# Patient Record
Sex: MALE | Race: WHITE
[De-identification: names, ages, dates, MRNs, and addresses within clinical notes are randomized per-mention and may not be internally consistent; named-entity substitution may affect disease eponyms.]

---

## 2018-07-28 ENCOUNTER — HOSPITAL ENCOUNTER (OUTPATIENT)
Dept: HOSPITAL 97 - ER | Age: 53
Setting detail: OBSERVATION
LOS: 1 days | Discharge: HOME | End: 2018-07-29
Attending: FAMILY MEDICINE | Admitting: FAMILY MEDICINE
Payer: COMMERCIAL

## 2018-07-28 DIAGNOSIS — K21.9: ICD-10-CM

## 2018-07-28 DIAGNOSIS — I89.0: ICD-10-CM

## 2018-07-28 DIAGNOSIS — L03.115: ICD-10-CM

## 2018-07-28 DIAGNOSIS — I10: ICD-10-CM

## 2018-07-28 DIAGNOSIS — I95.9: ICD-10-CM

## 2018-07-28 DIAGNOSIS — G89.29: ICD-10-CM

## 2018-07-28 DIAGNOSIS — R55: Primary | ICD-10-CM

## 2018-07-28 DIAGNOSIS — Z72.89: ICD-10-CM

## 2018-07-28 DIAGNOSIS — R79.89: ICD-10-CM

## 2018-07-28 LAB
ALBUMIN SERPL BCP-MCNC: 3.7 G/DL (ref 3.4–5)
ALP SERPL-CCNC: 88 U/L (ref 45–117)
ALT SERPL W P-5'-P-CCNC: 50 U/L (ref 12–78)
AST SERPL W P-5'-P-CCNC: 103 U/L (ref 15–37)
BUN BLD-MCNC: 5 MG/DL (ref 7–18)
CKMB CREATINE KINASE MB: < 1 NG/ML (ref 0.3–3.6)
CKMB CREATINE KINASE MB: < 1 NG/ML (ref 0.3–3.6)
GLUCOSE SERPLBLD-MCNC: 109 MG/DL (ref 74–106)
HCT VFR BLD CALC: 43.7 % (ref 39.6–49)
INR BLD: 1.16
LYMPHOCYTES # SPEC AUTO: 0.9 K/UL (ref 0.7–4.9)
MAGNESIUM SERPL-MCNC: 2 MG/DL (ref 1.8–2.4)
MCH RBC QN AUTO: 34.1 PG (ref 27–35)
MCV RBC: 99.2 FL (ref 80–100)
NT-PROBNP SERPL-MCNC: 23 PG/ML (ref ?–125)
PMV BLD: 7.8 FL (ref 7.6–11.3)
POTASSIUM SERPL-SCNC: 3.2 MMOL/L (ref 3.5–5.1)
RBC # BLD: 4.4 M/UL (ref 4.33–5.43)

## 2018-07-28 PROCEDURE — 70450 CT HEAD/BRAIN W/O DYE: CPT

## 2018-07-28 PROCEDURE — 93005 ELECTROCARDIOGRAM TRACING: CPT

## 2018-07-28 PROCEDURE — 82550 ASSAY OF CK (CPK): CPT

## 2018-07-28 PROCEDURE — 83880 ASSAY OF NATRIURETIC PEPTIDE: CPT

## 2018-07-28 PROCEDURE — 82962 GLUCOSE BLOOD TEST: CPT

## 2018-07-28 PROCEDURE — 83605 ASSAY OF LACTIC ACID: CPT

## 2018-07-28 PROCEDURE — 84484 ASSAY OF TROPONIN QUANT: CPT

## 2018-07-28 PROCEDURE — 80048 BASIC METABOLIC PNL TOTAL CA: CPT

## 2018-07-28 PROCEDURE — 81003 URINALYSIS AUTO W/O SCOPE: CPT

## 2018-07-28 PROCEDURE — 84443 ASSAY THYROID STIM HORMONE: CPT

## 2018-07-28 PROCEDURE — 71045 X-RAY EXAM CHEST 1 VIEW: CPT

## 2018-07-28 PROCEDURE — 82553 CREATINE MB FRACTION: CPT

## 2018-07-28 PROCEDURE — 85610 PROTHROMBIN TIME: CPT

## 2018-07-28 PROCEDURE — 85025 COMPLETE CBC W/AUTO DIFF WBC: CPT

## 2018-07-28 PROCEDURE — 36415 COLL VENOUS BLD VENIPUNCTURE: CPT

## 2018-07-28 PROCEDURE — 80074 ACUTE HEPATITIS PANEL: CPT

## 2018-07-28 PROCEDURE — 85730 THROMBOPLASTIN TIME PARTIAL: CPT

## 2018-07-28 PROCEDURE — 93970 EXTREMITY STUDY: CPT

## 2018-07-28 PROCEDURE — 93880 EXTRACRANIAL BILAT STUDY: CPT

## 2018-07-28 PROCEDURE — 84439 ASSAY OF FREE THYROXINE: CPT

## 2018-07-28 PROCEDURE — 84145 PROCALCITONIN (PCT): CPT

## 2018-07-28 PROCEDURE — 80061 LIPID PANEL: CPT

## 2018-07-28 PROCEDURE — 99285 EMERGENCY DEPT VISIT HI MDM: CPT

## 2018-07-28 PROCEDURE — 80053 COMPREHEN METABOLIC PANEL: CPT

## 2018-07-28 PROCEDURE — 87040 BLOOD CULTURE FOR BACTERIA: CPT

## 2018-07-28 PROCEDURE — 83735 ASSAY OF MAGNESIUM: CPT

## 2018-07-28 PROCEDURE — 96365 THER/PROPH/DIAG IV INF INIT: CPT

## 2018-07-28 PROCEDURE — 80076 HEPATIC FUNCTION PANEL: CPT

## 2018-07-28 RX ADMIN — CLINDAMYCIN HYDROCHLORIDE SCH MG: 150 CAPSULE ORAL at 22:25

## 2018-07-28 RX ADMIN — SODIUM CHLORIDE SCH MLS: 0.9 INJECTION, SOLUTION INTRAVENOUS at 22:24

## 2018-07-28 RX ADMIN — Medication SCH ML: at 22:26

## 2018-07-28 RX ADMIN — MUPIROCIN SCH: 20 OINTMENT TOPICAL at 21:00

## 2018-07-28 NOTE — RAD REPORT
EXAM DESCRIPTION:  CT - Head Brain Wo Cont - 7/28/2018 1:36 pm

 

CLINICAL HISTORY:  SYNCOPE

Seizure

 

COMPARISON:  Chest Single View dated 7/28/2018

 

TECHNIQUE:  All CT scans are performed using dose optimization technique as appropriate and may inclu
de automated exposure control or mA/KV adjustment according to patient size.

 

FINDINGS:  No intracranial hemorrhage, hydrocephalus or extra-axial fluid collection.No areas of brai
n edema or evidence of midline shift.

 

The paranasal sinuses and mastoids are clear. The calvarium is intact.

 

IMPRESSION:  No acute intracranial abnormality.

## 2018-07-28 NOTE — ER
Nurse's Notes                                                                                     

 [facility]                                                                

Name: Theodore Schmidt                                                                              

Age: 53 yrs                                                                                       

Sex: Male                                                                                         

: 1965                                                                                   

MRN: [id number]                                                                                   

Arrival Date: 2018                                                                          

Time: 13:10                                                                                       

Account#: [id number]                                                                            

Bed 24                                                                                            

Private MD:                                                                                       

Diagnosis: Cellulitis of right lower limb;Syncope and collapse;Hypokalemia                        

                                                                                                  

Presentation:                                                                                     

                                                                                             

13:11 Presenting complaint: EMS states: patient was at his sister's store when he had a       kr2 

      syncopal episode and started having a seizure. By the time we got there he was A\T\Ox4      

      but his blood pressure dropped to 60/40. We started and IV and fluids and his blood         

      pressure came up to 90/50. He had a fall 1 week ago, hit his head, had a CT at the          

      hospital, he does have a hematoma from that fall. His sister reports she caught him         

      before he hit his head this time. Transition of care: patient was not received from         

      another setting of care. Onset of symptoms was 2018. Risk Assessment: Do you       

      want to hurt yourself or someone else? Patient reports no desire to harm self or            

      others. Initial Sepsis Screen: Does the patient meet any 2 criteria? No. Patient's          

      initial sepsis screen is negative. Does the patient have a suspected source of              

      infection? No. Patient's initial sepsis screen is negative. Care prior to arrival:          

      Medication(s) given: Normal saline infusion, 800mL IV initiated. 20 GA, in the left         

      antecubital area, Glucose check: 109.                                                       

13:11 Method Of Arrival: EMS: [facility]                                                    kr2 

13:11 Acuity: LIDYA 3                                                                           kr2 

13:38 Presenting complaint: Sister reports patient was sitting on a stool, began having a     kr2 

      seizure, passed out and she caught him before he hit the floor.                             

                                                                                                  

Triage Assessment:                                                                                

13:24 General: Appears in no apparent distress. comfortable, well groomed, well developed,    kr2 

      well nourished, Behavior is calm, cooperative, appropriate for age. Pain: Complains of      

      pain in back of head Pain does not radiate. Pain currently is 5 out of 10 on a pain         

      scale. Quality of pain is described as aching, tender, Pain began 1 week ago Is             

      continuous, Alleviated by medications, rest. Neuro: Level of Consciousness is awake,        

      alert, obeys commands, Oriented to person, place, time, situation, Appropriate for age      

       are equal bilaterally Moves all extremities. Speech is normal, Facial symmetry        

      appears normal, Pupils are PERRLA, Intact Reports headache occipital area, since 1 week     

      ago, following a similar episode today, seizure and fall.                                   

                                                                                                  

Historical:                                                                                       

- Allergies:                                                                                      

13:23 Iodine;                                                                                 kr2 

13:23 PENICILLINS;                                                                            kr2 

- Home Meds:                                                                                      

13:23 Norco  mg Oral tab [Active]; Tylenol #3 Oral [Active]; Lasix Oral [Active];       kr2 

      pantoprazole oral oral [Active]; Pepcid Oral [Active]; Benicar 40 mg oral tab 0.5 tab       

      [Active];                                                                                   

- PMHx:                                                                                           

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 

                                                                                                  

- Immunization history:: Adult Immunizations unknown.                                             

- Social history:: Smoking status: Patient/guardian denies using tobacco.                         

- Ebola Screening: : No symptoms or risks identified at this time.                                

                                                                                                  

                                                                                                  

Screenin:24 Abuse screen: Denies threats or abuse. Denies injuries from another. Nutritional        kr2 

      screening: No deficits noted. Tuberculosis screening: No symptoms or risk factors           

      identified. Fall Risk Fall in past 12 months (25 points). IV access (20 points).            

                                                                                                  

Assessment:                                                                                       

13:29 General: Appears in no apparent distress. comfortable, slender, well groomed, well      kr2 

      developed, well nourished, Behavior is calm, cooperative, appropriate for age, Smells       

      of alcohol. Neuro: Level of Consciousness is awake, alert, obeys commands, Oriented to      

      person, place, time, situation, Appropriate for age. Cardiovascular: Edema is 2+ to         

      left foot, left toes, right foot and right toes Patient reports he has lymphadema           

      Rhythm is regular. Respiratory: Airway is patent Respiratory effort is even, unlabored,     

      Respiratory pattern is regular, symmetrical. GI: Abdomen is flat, non-distended,            

      Patient currently denies nausea, vomiting, history of seizures. : Denies burning with     

      urination. EENT: Oral mucosa is moist. Derm: Skin is healthy with good turgor, Skin is      

      pink, warm \T\ dry. Erythema to right ankle. Patient has a scab there, reports he thought   

      he had been bitten by a mosquito, the area got infected and he was treated in the           

      hospital approximately one month ago. Musculoskeletal: Circulation, motion, and             

      sensation intact.                                                                           

13:29 Reassessment: Patient reports he has prescriptions for Tylenol 3 and Norco 10 but does  kr2 

      not take them.                                                                              

13:32 Reassessment: Patient taken to CT.                                                      kr2 

13:42 Reassessment: Patient back from CT at this time.                                        kr2 

13:52 Reassessment: Ultrasound tech in room at this time.                                     kr2 

15:30 Reassessment: Patient appears in no apparent distress at this time. Patient and/or      kr2 

      family updated on plan of care and expected duration. Pain level reassessed. Patient is     

      alert, oriented x 3, equal unlabored respirations, skin warm/dry/pink. Patient              

      complaining of headache, provider notified.                                                 

15:42 Reassessment: Patient appears in no apparent distress at this time. Patient and/or      kr2 

      family updated on plan of care and expected duration. Pain level reassessed. Patient is     

      alert, oriented x 3, equal unlabored respirations, skin warm/dry/pink. Patient has been     

      medicated as ordered for pain, see MAR.                                                     

17:00 Reassessment: Patient appears in no apparent distress at this time. Patient and/or      kr2 

      family updated on plan of care and expected duration. Pain level reassessed. Patient is     

      alert, oriented x 3, equal unlabored respirations, skin warm/dry/pink. Patient states       

      symptoms have improved.                                                                     

18:30 Reassessment: Patient appears in no apparent distress at this time. Patient and/or      kr2 

      family updated on plan of care and expected duration. Pain level reassessed. Patient is     

      alert, oriented x 3, equal unlabored respirations, skin warm/dry/pink. Patient states       

      feeling better.                                                                             

19:33 Reassessment: Patient appears in no apparent distress at this time. Patient and/or      kr2 

      family updated on plan of care and expected duration. Pain level reassessed. Patient is     

      alert, oriented x 3, equal unlabored respirations, skin warm/dry/pink. Patient given        

      ordered food tray Patient states feeling better.                                            

                                                                                                  

Vital Signs:                                                                                      

13:11  / 74; Pulse 83; Resp 18; Temp 97.7; Pulse Ox 99% ; Weight 81.65 kg; Height 5 ft. kr2 

      11 in. (180.34 cm); Pain 5/10;                                                              

13:53  / 78; Pulse 97; Resp 17; Pulse Ox 100% on R/A;                                   kr2 

15:43  / 76; Pulse 81; Resp 17; Pulse Ox 100% on R/A;                                   kr2 

18:30  / 87; Pulse 88; Resp 15; Pulse Ox 97% on R/A;                                    kr2 

19:35  / 79; Pulse 80; Resp 17; Temp 97.9(O); Pulse Ox 100% on R/A;                     kr2 

13:11 Body Mass Index 25.10 (81.65 kg, 180.34 cm)                                             kr2 

                                                                                                  

ED Course:                                                                                        

13:10 Patient arrived in ED.                                                                  kr2 

13:11 [facility]                                                        snw 

13:11 Cale Patel MD is Attending Physician.                                              snw 

13:11 Patient has correct armband on for positive identification. Placed in gown. Bed in low  kr2 

      position. Call light in reach. Side rails up X2. Seizure precautions initiated. Cardiac     

      monitor on. Pulse ox on. NIBP on. Door closed. Lights dimmed. Warm blanket given. Head      

      of bed elevated.                                                                            

13:11 Maintain EMS IV. Dressing intact. Good blood return noted. Site clean \T\ dry. Gauge \T\    kr
2

      site: 20g LAC. Flushed left antecubital with 5 ml normal saline.                            

13:15 Triage completed.                                                                       kr2 

13:28 Arm band placed on.                                                                     kr2 

13:36 CT completed. Patient moved to CT via stretcher. Patient moved back from CT.            cw1 

13:37 CT Head Brain wo Cont In Process Unspecified.                                           EDMS

13:37 X-ray completed. Portable x-ray completed in exam room. Patient tolerated procedure     la2 

      well.                                                                                       

13:41 XRAY Chest (1 view) In Process Unspecified.                                             EDMS

13:43 Radiology exam delayed due to with CT.                                                  sg3 

13:47 Ultrasound completed. Patient tolerated well.                                           sg3 

13:50 EKG done, by ED staff, reviewed by Zena ALVAREZ.                               cb2 

13:52 Pricila Ordonez RN is Primary Nurse.                                                     kr2 

14:33 Initial lab(s) drawn, by me, sent to lab. Inserted saline lock: 20 gauge in right       cb2 

      antecubital area, using aseptic technique. Blood collected.                                 

16:51 Lee Zhang DO is Hospitalizing Provider.                                           snw 

19:48 No provider procedures requiring assistance completed. Patient admitted, IV remains in  kr2 

      place.                                                                                      

                                                                                                  

Administered Medications:                                                                         

15:25 Drug: Clindamycin 900 mg Route: IVPB; Infused Over: 30 mins; Site: left antecubital;    kr2 

16:00 Follow up: Response: No adverse reaction; IV Status: Completed infusion                 kr2 

15:26 Drug: NS 0.9% 500 ml Route: IV; Rate: bolus; Site: left antecubital;                    kr2 

16:30 Follow up: Response: No adverse reaction; IV Status: Completed infusion                 kr2 

15:27 Drug: K-Lyte Effervescent Tablet 50 mEq Route: PO;                                      kr2 

19:47 Follow up: Response: No adverse reaction                                                kr2 

15:30 Drug: Motrin 400 mg Route: PO;                                                          kr2 

19:47 Follow up: Response: No adverse reaction; Pain is decreased                             kr2 

                                                                                                  

                                                                                                  

Point of Care Testing:                                                                            

      Blood Glucose:                                                                              

14:15 Blood Glucose: 105 mg/dL;                                                               kr2 

      Ranges:                                                                                     

                                                                                                  

Outcome:                                                                                          

16:51 Decision to Hospitalize by Provider.                                                    snw 

19:48 Admitted to Tele accompanied by tech, family with patient, via stretcher, room 403,     kr2 

      with chart, Report called to  DAX Russell                                                     

19:48 Condition: good                                                                             

19:48 Instructed on the need for admit, Demonstrated understanding of instructions.               

19:52 Patient left the ED.                                                                    kr2 

                                                                                                  

Signatures:                                                                                       

Dispatcher [employer]                           Zena Lara, KIM                 FNP-Lucille Pinto                             cw1                                                  

Mj Otoole Leslie                               la2                                                  

Pricila Ordonez RN                       RN   kr2                                                  

Catia Douglas                               sg3                                                  

                                                                                                  

Corrections: (The following items were deleted from the chart)                                    

13:39 13:11 Care prior to arrival: Medication(s) given: Normal saline infusion, 500 mL, IV    kr2 

      initiated. 20 GA, in the left antecubital area, Glucose check: 109 kr2                      

13:40 13:24 Pain: Complains of pain in back of head Pain does not radiate. Pain currently is  kr2 

      4 out of 10 on a pain scale. Quality of pain is described as aching, tender, Pain began     

      1 week ago Is continuous, Alleviated by medications, rest, kr2                              

13:45 13:29 Cardiovascular: Rhythm is regular kr2                                             kr2 

14:17 14:17 In radiology for Extrem Venous W Compression [email or web address]. EDMS                 sg3 

15: 13:29 Derm: Skin is intact, is healthy with good turgor, Skin is pink, warm \T\ dry. kr2  kr2

15:42 13:29 Musculoskeletal: Circulation, motion, and sensation intact. kr2                   kr2 

19:51 19:35  / 79; Pulse 80bpm; Resp 17bpm; Pulse Ox 100% RA; kr2                       kr2 

                                                                                                  

**************************************************************************************************

## 2018-07-28 NOTE — EDPHYS
Physician Documentation                                                                           

 [facility]                                                                

Name: Theodore Schmidt                                                                              

Age: 53 yrs                                                                                       

Sex: Male                                                                                         

: 1965                                                                                   

MRN: [id number]                                                                                   

Arrival Date: 2018                                                                          

Time: 13:10                                                                                       

Account#: [id number]                                                                            

Bed 24                                                                                            

Private MD:                                                                                       

ED Physician Cale Patel                                                                       

HPI:                                                                                              

                                                                                             

13:39 This 53 yrs old Male presents to ER via EMS with complaints of Syncope - Seizure.       snw 

13:39 The patient has experienced syncope, collapsed. Onset: The symptoms/episode             snw 

      began/occurred suddenly, just prior to arrival. Duration: This was a single episode,        

      that lasted an unknown period of time. Context: the episode(s) was witnessed, by            

      family, sister, occurred at a relative's home, occurred while the patient was standing,     

      Just prior to the episode the patient experienced no apparent symptoms. Associated          

      signs and symptoms: The patient has no apparent associated signs or symptoms. Current       

      symptoms: headache. It is unknown whether or not the patient has had similar symptoms       

      in the past. The patient has not recently seen a physician.                                 

                                                                                                  

Historical:                                                                                       

- Allergies:                                                                                      

13:23 Iodine;                                                                                 kr2 

13:23 PENICILLINS;                                                                            kr2 

- Home Meds:                                                                                      

13:23 Norco  mg Oral tab [Active]; Tylenol #3 Oral [Active]; Lasix Oral [Active];       kr2 

      pantoprazole oral oral [Active]; Pepcid Oral [Active]; Benicar 40 mg oral tab 0.5 tab       

      [Active];                                                                                   

- PMHx:                                                                                           

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 

                                                                                                  

- Immunization history:: Adult Immunizations unknown.                                             

- Social history:: Smoking status: Patient/guardian denies using tobacco.                         

- Ebola Screening: : No symptoms or risks identified at this time.                                

                                                                                                  

                                                                                                  

ROS:                                                                                              

13:39 Constitutional: Negative for fever, chills, and weight loss, Eyes: Negative for injury, snw 

      pain, redness, and discharge, ENT: Negative for injury, pain, and discharge, Neck:          

      Negative for injury, pain, and swelling, Cardiovascular: Negative for chest pain,           

      palpitations, and edema, Respiratory: Negative for shortness of breath, cough,              

      wheezing, and pleuritic chest pain, Abdomen/GI: Negative for abdominal pain, nausea,        

      vomiting, diarrhea, and constipation, Back: Negative for injury and pain, : Negative      

      for injury, bleeding, discharge, and swelling, MS/Extremity: Negative for injury and        

      deformity, Skin: Negative for injury, rash, and discoloration.                              

13:39 Neuro: Positive for headache, seizure activity, syncope.                                    

                                                                                                  

Exam:                                                                                             

13:32 Constitutional:  This is a well developed, well nourished patient who is awake, alert,  snw 

      and in no acute distress. Head/Face:  Normocephalic, atraumatic. Eyes:  Pupils equal        

      round and reactive to light, extra-ocular motions intact.  Lids and lashes normal.          

      Conjunctiva and sclera are non-icteric and not injected.  Cornea within normal limits.      

      Periorbital areas with no swelling, redness, or edema. ENT:  Nares patent. No nasal         

      discharge, no septal abnormalities noted.  Tympanic membranes are normal and external       

      auditory canals are clear.  Oropharynx with no redness, swelling, or masses, exudates,      

      or evidence of obstruction, uvula midline.  Mucous membranes moist. Neck:  Trachea          

      midline, no thyromegaly or masses palpated, and no cervical lymphadenopathy.  Supple,       

      full range of motion without nuchal rigidity, or vertebral point tenderness.  No            

      Meningismus. Chest/axilla:  Normal chest wall appearance and motion.  Nontender with no     

      deformity.  No lesions are appreciated. Cardiovascular:  Regular rate and rhythm with a     

      normal S1 and S2.  No gallops, murmurs, or rubs.  Normal PMI, no JVD.  No pulse             

      deficits. Respiratory:  Lungs have equal breath sounds bilaterally, clear to                

      auscultation and percussion.  No rales, rhonchi or wheezes noted.  No increased work of     

      breathing, no retractions or nasal flaring. Abdomen/GI:  Soft, non-tender, with normal      

      bowel sounds.  No distension or tympany.  No guarding or rebound.  No evidence of           

      tenderness throughout. Back:  No spinal tenderness.  No costovertebral tenderness.          

      Full range of motion. Neuro:  Awake and alert, GCS 15, oriented to person, place, time,     

      and situation.  Cranial nerves II-XII grossly intact.  Motor strength 5/5 in all            

      extremities.  Sensory grossly intact.  Cerebellar exam normal.  Normal gait.                

13:32 Skin: Appearance: Temperature: warm, erythema to left lateral ankle, + lymphedema to        

      bilateral lower ext.                                                                        

                                                                                                  

Vital Signs:                                                                                      

13:11  / 74; Pulse 83; Resp 18; Temp 97.7; Pulse Ox 99% ; Weight 81.65 kg; Height 5 ft. kr2 

      11 in. (180.34 cm); Pain 5/10;                                                              

13:53  / 78; Pulse 97; Resp 17; Pulse Ox 100% on R/A;                                   kr2 

15:43  / 76; Pulse 81; Resp 17; Pulse Ox 100% on R/A;                                   kr2 

18:30  / 87; Pulse 88; Resp 15; Pulse Ox 97% on R/A;                                    kr2 

19:35  / 79; Pulse 80; Resp 17; Temp 97.9(O); Pulse Ox 100% on R/A;                     kr2 

13:11 Body Mass Index 25.10 (81.65 kg, 180.34 cm)                                             kr2 

                                                                                                  

MDM:                                                                                              

13:11 Patient medically screened.                                                             snw 

16:51 Data reviewed: vital signs, nurses notes. Data interpreted: Pulse oximetry: on room air snw 

      is 100 %. Interpretation: normal. Counseling: I had a detailed discussion with the          

      patient and/or guardian regarding: the historical points, exam findings, and any            

      diagnostic results supporting the discharge/admit diagnosis, lab results, radiology         

      results, the need for further work-up and treatment in the hospital. Physician              

      consultation: Lee Zhang DO was called at 16:52, was contacted at 16:52, regarding       

      admission, to the telemetry unit. and will see patient in ED.                               

                                                                                                  

                                                                                             

13:13 Order name: Basic Metabolic Panel; Complete Time: 15:w 

                                                                                             

13:13 Order name: CBC with Diff; Complete Time: 14:58                                         w 

                                                                                             

13:13 Order name: Ckmb; Complete Time: 15:w 

                                                                                             

13:13 Order name: CPK; Complete Time: 15:w 

                                                                                             

13:13 Order name: LFT's; Complete Time: 15:w 

                                                                                             

13:13 Order name: Magnesium; Complete Time: 15:w 

                                                                                             

13:13 Order name: NT PRO-BNP; Complete Time: 15:w 

                                                                                             

13:13 Order name: PT-INR; Complete Time: 14:58                                                w 

                                                                                             

13:13 Order name: Ptt, Activated; Complete Time: 14:58                                        w 

                                                                                             

13:13 Order name: Troponin (emerg Dept Use Only); Complete Time: 14:58                        [facility] 

                                                                                             

13:13 Order name: Blood Culture Adult (2)                                                     [facility] 

                                                                                             

13:13 Order name: Lactate; Complete Time: 15:02                                               [facility] 

                                                                                             

15:04 Order name: Urine Dipstick--Ancillary (enter results); Complete Time: 15:20             eb  

                                                                                             

16:50 Order name: Procalcitonin; Complete Time: 17:39                                         [facility] 

                                                                                             

13:13 Order name: XRAY Chest (1 view); Complete Time: 13:57                                   [facility] 

                                                                                             

13:13 Order name: EKG; Complete Time: 13:14                                                   [facility] 

                                                                                             

13:13 Order name: CT Head Brain wo Cont; Complete Time: 13:57                                 [facility] 

                                                                                             

13:13 Order name: US Extremity Venous W Compression David; Complete Time: 15:05                 [facility] 

                                                                                             

17:11 Order name: CKMB Creatine Kinase MB                                                     [facility]

                                                                                             

17:11 Order name: CKMB Creatine Kinase MB                                                     [facility]

                                                                                             

17:11 Order name: CONS Physician Consult                                                      [facility]

                                                                                             

17:11 Order name: Heart Healthy                                                               [facility]

                                                                                             

17:11 Order name: Creatine Phosphokinase                                                      [facility]

                                                                                             

17:11 Order name: Troponin I                                                                  [facility]

                                                                                             

17:15 Order name: Diet Heart Healthy; Complete Time: 17:15                                    [facility] 

                                                                                             

18:32 Order name: Lactate Sepsis 2 HR Follow-up; Complete Time: 18:33                         [facility]

                                                                                             

13:13 Order name: Cardiac monitoring; Complete Time: 13:41                                    [facility] 

                                                                                             

13:13 Order name: EKG - Nurse/Tech; Complete Time: 13:50                                      [facility] 

                                                                                             

13:13 Order name: IV Saline Lock; Complete Time: 13:41                                        [facility] 

                                                                                             

13:13 Order name: Labs collected and sent; Complete Time: 14:34                               [facility] 

                                                                                             

13:13 Order name: O2 Per Protocol; Complete Time: 13:41                                       [facility] 

                                                                                             

13:13 Order name: O2 Sat Monitoring; Complete Time: 13:41                                     [facility] 

                                                                                             

13:13 Order name: Urine Dipstick-Ancillary (obtain specimen); Complete Time: 15:27            snw 

                                                                                                  

Administered Medications:                                                                         

15:25 Drug: Clindamycin 900 mg Route: IVPB; Infused Over: 30 mins; Site: left antecubital;    kr2 

16:00 Follow up: Response: No adverse reaction; IV Status: Completed infusion                 kr2 

15:26 Drug: NS 0.9% 500 ml Route: IV; Rate: bolus; Site: left antecubital;                    kr2 

16:30 Follow up: Response: No adverse reaction; IV Status: Completed infusion                 kr2 

15:27 Drug: K-Lyte Effervescent Tablet 50 mEq Route: PO;                                      kr2 

19:47 Follow up: Response: No adverse reaction                                                kr2 

15:30 Drug: Motrin 400 mg Route: PO;                                                          kr2 

19:47 Follow up: Response: No adverse reaction; Pain is decreased                             kr2 

                                                                                                  

                                                                                                  

Point of Care Testing:                                                                            

      Blood Glucose:                                                                              

14:15 Blood Glucose: 105 mg/dL;                                                               kr2 

      Ranges:                                                                                     

      Critical Glucose Levels:Adult <50 mg/dl or >400 mg/dl  <40 mg/dl or >180 mg/dl       

Disposition:                                                                                      

                                                                                             

07:11 Co-signature as Attending Physician, Cale Patel MD I agree with the assessment and   kdr 

      plan of care.                                                                               

                                                                                                  

Disposition:                                                                                      

18 16:51 Hospitalization ordered by Lee Zhang for Observation. Preliminary             

  diagnosis are Cellulitis of right lower limb, Syncope and collapse,                             

  Hypokalemia.                                                                                    

- Bed requested for Telemetry/MedSurg (observation).                                              

- Status is Observation.                                                                      kr2 

- Condition is Stable.                                                                            

- Problem is an acute exacerbation.                                                               

- Symptoms have improved.                                                                         

UTI on Admission? No                                                                              

                                                                                                  

                                                                                                  

                                                                                                  

Signatures:                                                                                       

Dispatcher [employer]                           EDMS                                                 

Cale Patel MD MD   [facility]                                                  

Zena Méndez, SELENAP-C                 FNP-CsnPricila Barrera RN                       RN   edith2                                                  

Kusum Ingram                                                   

                                                                                                  

Corrections: (The following items were deleted from the chart)                                    

                                                                                             

18:31 16:51 Hospitalization Ordered by Lee Zhang DO for Observation. Preliminary          eb  

      diagnosis is Cellulitis of right lower limb; Syncope and collapse; Hypokalemia. Bed         

      requested for Telemetry/MedSurg (observation). Status is Observation. Condition is          

      Stable. Problem is an acute exacerbation. Symptoms have improved. UTI on Admission? No.     

      snw                                                                                         

19:52 18:31 2018 16:51 Hospitalization Ordered by Lee Zhang DO for Observation.     kr2 

      Preliminary diagnosis is Cellulitis of right lower limb; Syncope and collapse;              

      Hypokalemia. Bed requested for Telemetry/MedSurg (observation). Status is Observation.      

      Condition is Stable. Problem is an acute exacerbation. Symptoms have improved. UTI on       

      Admission? No. eb                                                                           

                                                                                                  

**************************************************************************************************

## 2018-07-28 NOTE — P.HP
Certification for Inpatient


Patient admitted to: Observation


With expected LOS: <2 Midnights


Patient will require the following post-hospital care: None


Practitioner: I am a practitioner with admitting privileges, knowledge of 

patient current condition, hospital course, and medical plan of care.


Services: Services provided to patient in accordance with Admission 

requirements found in Title 42 Section 412.3 of the Code of Federal Regulations





Patient History


Date of Service: 07/28/18


Primary Care Provider: Rick Zafar(Visiting)


Reason for admission: Syncope


History of Present Illness: 





53-year-old  male presented emergency room with a syncopal episode.





Patient is visiting in the area.  He is originally from [place].  He is 

visiting his sister.  Patient reports that he fell about a week ago.  He 

apparently had a syncopal episode at that time.  He hit his head.  He was 

evaluated at a hospital in [place].  Patient found to have a right lower 

extremity cellulitis is well.  Patient was treated and evaluated.  He was to 

follow up with cardiology this week.  Today the patient was sitting at home 

with his sister.  He apparently had a syncopal episode.  During this time the 

patient does not recall the event.  Sister was present and helped some to the 

floor.  Sister reported that he started to shake for about 1.5 min.  He was 

more of a jerking sensation to the upper extremity.  This resolved.  EMS was 

called.  He was found to have blood pressure of 70/40.  He was given IV fluids.

  This improved with a blood pressure of 90/50.  Patient with history of 

lymphedema, GERD, alcohol use and hypertension.  Patient has been using Lasix 

40 mg daily, BenicarHCT 40 mg/25 mg daily.





In the ER patient was evaluated.  Initial blood pressure in the ER was 113/74.  

He is without any distress. He denied any chest pain, shortness of breath.  

Initial workup included venous Dopplers of the lower extremity which showed no 

DVT.  Chest x-ray unremarkable.  CT of the head unremarkable.  CBC 

unremarkable.  Lactic acid slightly elevated at 2.7.  Sodium 136, potassium 3.2

, creatinine 0.90 with a blood sugar 109. Due to nature of his symptoms the 

patient was admitted for observation.





When I evaluated the patient in the ER, he admits to drinking about 2-3 beers a 

day.  He is compliant with his blood pressure medication.  He has been feeling 

weak over the past several weeks.  He is also taking pain medication Tylenol #3.


Home medications list reviewed: Yes





- Past Medical/Surgical History


Diabetic: No


-: Hypertension


-: Lymphedema


-: GERD


-: Alcohol use


-: Left ankle surgery


Psychosocial/ Personal History: The patient is .  He lives in [place].  He has 3 children.





- Family History


Family History: Reviewed- Non-Contributory





- Social History


Smoking Status: Never smoker


Alcohol use: Yes


Caffeine use: No


Place of Residence: Home





Review of Systems


General: Weakness, Malaise


Eyes: Unremarkable


ENT: Unremarkable


Respiratory: As per HPI


Cardiovascular: Unremarkable


Gastrointestinal: Unremarkable


Genitourinary: Unremarkable


Musculoskeletal: Unremarkable


Integumentary: As per HPI


Neurological: Weakness, As per HPI


Lymphatics: Unremarkable





Physical Examination





- Physical Exam


General: Alert, In no apparent distress, Oriented x3, Cooperative


HEENT: Atraumatic, Normocephalic, Mucous membr. moist/pink, Other (Patient 

reports some pain to the top of his head.)


Neck: Supple


Respiratory: Clear to auscultation bilaterally, Normal air movement


Cardiovascular: Normal pulses, Regular rate/rhythm


Gastrointestinal: Normal bowel sounds, Soft and benign, Non-distended, No 

tenderness, No masses, No rebound, No guarding


Musculoskeletal: No tenderness, No warmth


Integumentary: Other (Healing wound to the right lower extremity just above the 

knee.  No significant erythema, pain extremities noted bilateral just above the 

ankle region.)


Neurological: Normal speech, Normal strength at 5/5 x4 extr, Normal tone, 

Normal affect





- Studies


Laboratory Data (last 24 hrs)





07/28/18 14:15: PT 13.7 H, INR 1.16, APTT 34.1


07/28/18 14:15: WBC 6.0, Hgb 15.0, Hct 43.7, Plt Count 187


07/28/18 14:15: Sodium 136, Potassium 3.2 L, BUN 5 L, Creatinine 0.90, Glucose 

109 H, Magnesium 2.0, Total Bilirubin 0.6,  H, ALT 50, Alkaline 

Phosphatase 88








Assessment and Plan





- Problems (Diagnosis)


(1) Syncope


Current Visit: Yes   Status: Acute   


Plan: 


Syncope likely from volume depletion.  Patient recently taking Lasix 40 mg 

daily and Benicar HCT 40/25 mg.  Patient was initially hypotensive.  Patient 

given IV fluids in the field.  Blood pressure stable in the emergency room.  

Initial workup unremarkable.  Will continue with IV fluids.  Adjustments in 

medication may be required.  Will order EEG to evaluate for possible seizure 

disorder.  There is no history of this.  Will also order echocardiogram, 

carotid Doppler.  Cardiology consulted to further evaluate.  Patient lives in 

[place].  Will consider workup in [place] as he is to see a 

cardiologist here soon.  Anticipate discharge tomorrow if okay with cardiology 

with adjustments in medication.


Qualifiers: 


   Syncope type: vasovagal syncope   Qualified Code(s): R55 - Syncope and 

collapse   





(2) Hypotension


Current Visit: Yes   Status: Acute   


Plan: 


Likely from medication.  Continue as above.  Will start IV fluids.


Qualifiers: 


   Hypotension type: orthostatic hypotension   Qualified Code(s): I95.1 - 

Orthostatic hypotension   





(3) Hypertension


Current Visit: Yes   Status: Chronic   


Plan: 


We will hold blood pressure medication at this time.  Will make adjustments 

medication.


Qualifiers: 


   Hypertension type: essential hypertension   Qualified Code(s): I10 - 

Essential (primary) hypertension   





(4) Lymphedema


Current Visit: Yes   Status: Chronic   


Plan: 


Patient with chronic lymphedema.  Continue as above.  This can be further 

evaluated as an outpatient.








(5) Cellulitis


Current Visit: Yes   Status: Chronic   


Plan: 


Patient recently hospitalized for cellulitis 1 week ago.  This has improved.  

Will continue with clindamycin.  Patient has follow up with PCP later this week.


Qualifiers: 


   Site of cellulitis: extremity   Site of cellulitis of extremity: lower 

extremity   Laterality: right   Qualified Code(s): L03.115 - Cellulitis of 

right lower limb   





(6) GERD (gastroesophageal reflux disease)


Current Visit: Yes   Status: Chronic   


Plan: 


Continue PPI


Qualifiers: 


   Esophagitis presence: esophagitis presence not specified   Qualified Code(s)

: K21.9 - Gastro-esophageal reflux disease without esophagitis   





(7) Alcohol use


Current Visit: Yes   Status: Chronic   


Plan: 


Alcohol cessation addressed in detail.








(8) Chronic pain


Current Visit: Yes   Status: Chronic   


Plan: 


Patient is using Tylenol #3 for chronic pain. We need to limit use of 

medication.








(9) Elevated liver function tests


Current Visit: Yes   Status: Acute   


Plan: 


Slight elevation in AST.  Likely from alcohol abuse.  Will send for hepatitis 

panel





Discharge Plan: Home


Plan to discharge in: 24 Hours





- Advance Directives


Does patient have a Living Will: No


Does patient have a Durable POA for Healthcare: No





- Code Status/Comfort Care


Code Status Assessed: Yes


Time Spent Managing Pts Care (In Minutes): 55

## 2018-07-28 NOTE — RAD REPORT
EXAM DESCRIPTION:  VAS - Extrem Venous W Compress David - 7/28/2018 2:17 pm

 

CLINICAL HISTORY:  SWELLING

Bilateral leg edema and swelling.

 

COMPARISON:  <Comparisons>

 

TECHNIQUE:  Real-time sonographic interrogation of the left and right lower extremity deep venous sys
tems was performed.

 

FINDINGS:  Normal compressibility, flow augmentation, phasic flow and spontaneous flow is identified 
in both the left and right lower extremity deep venous systems.

 

IMPRESSION:  No sonographic evidence of left or right lower extremity deep venous thrombosis.

## 2018-07-28 NOTE — RAD REPORT
EXAM DESCRIPTION:  RAD - Chest Single View - 7/28/2018 1:41 pm

 

CLINICAL HISTORY:  syncope

Chest pain.

 

COMPARISON:  No comparisons

 

FINDINGS:  Portable technique limits examination quality.

 

The lungs are grossly clear. The heart is normal in size. No displaced fractures.

 

IMPRESSION:  No acute intrathoracic process suspected.

## 2018-07-29 LAB
ALBUMIN SERPL BCP-MCNC: 2.7 G/DL (ref 3.4–5)
ALP SERPL-CCNC: 77 U/L (ref 45–117)
ALT SERPL W P-5'-P-CCNC: 38 U/L (ref 12–78)
AST SERPL W P-5'-P-CCNC: 74 U/L (ref 15–37)
BUN BLD-MCNC: 6 MG/DL (ref 7–18)
CKMB CREATINE KINASE MB: < 1 NG/ML (ref 0.3–3.6)
GLUCOSE SERPLBLD-MCNC: 90 MG/DL (ref 74–106)
HCT VFR BLD CALC: 36.5 % (ref 39.6–49)
HDLC SERPL-MCNC: 43 MG/DL (ref 40–60)
LDLC SERPL CALC-MCNC: 66 MG/DL (ref ?–130)
LYMPHOCYTES # SPEC AUTO: 0.9 K/UL (ref 0.7–4.9)
MAGNESIUM SERPL-MCNC: 1.7 MG/DL (ref 1.8–2.4)
MCH RBC QN AUTO: 34.7 PG (ref 27–35)
MCV RBC: 98.9 FL (ref 80–100)
PMV BLD: 8 FL (ref 7.6–11.3)
POTASSIUM SERPL-SCNC: 4 MMOL/L (ref 3.5–5.1)
RBC # BLD: 3.69 M/UL (ref 4.33–5.43)
TSH SERPL DL<=0.05 MIU/L-ACNC: 1.37 UIU/ML (ref 0.36–3.74)

## 2018-07-29 RX ADMIN — SODIUM CHLORIDE SCH MLS: 0.9 INJECTION, SOLUTION INTRAVENOUS at 05:48

## 2018-07-29 RX ADMIN — CLINDAMYCIN HYDROCHLORIDE SCH MG: 150 CAPSULE ORAL at 09:36

## 2018-07-29 RX ADMIN — MUPIROCIN SCH: 20 OINTMENT TOPICAL at 09:00

## 2018-07-29 RX ADMIN — Medication SCH: at 09:00

## 2018-07-29 NOTE — EKG
Test Date:    2018-07-28               Test Time:    13:47:29

Technician:   TRACY                                     

                                                     

MEASUREMENT RESULTS:                                       

Intervals:                                           

Rate:         77                                     

DE:           148                                    

QRSD:         110                                    

QT:           372                                    

QTc:          420                                    

Axis:                                                

P:            61                                     

DE:           148                                    

QRS:          -40                                    

T:            48                                     

                                                     

INTERPRETIVE STATEMENTS:                                       

                                                     

Normal sinus rhythm

Left axis deviation

Abnormal ECG

No previous ECG available for comparison



Electronically Signed On 07-29-18 09:32:53 CDT by Joseph Zapata

## 2018-07-29 NOTE — RAD REPORT
EXAM DESCRIPTION:  FRED Hebert CP - 7/29/2018 1:44 pm

 

CLINICAL HISTORY:  Syncope

 

COMPARISON:  None.

 

TECHNIQUE:  Real-time sonographic evaluation of both carotid systems was performed. Grayscale and Dop
pler Doppler interrogation was performed with waveform tracing bilaterally.

 

FINDINGS:  Normal high resistance waveforms are noted in both external carotid arteries. The common c
arotid arteries and internal carotid arteries show normal low resistance waveforms.

 

Calcified and noncalcified plaquing changes are present. Peak systolic and end diastolic velocity dennise
ues and the ICA/CCA ratios are in the non-hemodynamically significant range. Common carotid velocity 
values are 84 cm/second on the right and 82 cm/second on the left. Right-side ICA velocity values ran
ge from 50-61 cm/second. Left ICA velocities range from 48-53 cm/second. ICA/CCA ratios are 0.7 on th
e right and 0.6 on the left.

 

Antegrade flow seen in both vertebral arteries.

 

Velocity values and ratios were recorded and are retained in the patient's imaging records.

 

 

IMPRESSION:  Calcified and noncalcified plaquing changes are present. On visual inspection no signifi
cant luminal narrowing.

 

Velocity values and ratios indicate no significant stenosis.

## 2018-07-29 NOTE — P.DS
Admission Date: 07/28/18


Discharge Date: 07/29/18


Primary Care Provider: [place](Visiting)


Disposition: ROUTINE DISCHARGE


Discharge Condition: GOOD


Reason for Admission: Syncope


Procedures: 





CT head:  No acute changes otherwise unremarkable.





Venous Doppler lower extremity:  No DVT noted.





Chest x-ray:  Unremarkable.





- Problems


(1) Syncope


Current Visit: Yes   Status: Suspected   


Qualifiers: 


   Syncope type: vasovagal syncope   Qualified Code(s): R55 - Syncope and 

collapse   





(2) Hypotension


Current Visit: Yes   Status: Resolved   


Qualifiers: 


   Hypotension type: orthostatic hypotension   Qualified Code(s): I95.1 - 

Orthostatic hypotension   





(3) Hypertension


Current Visit: Yes   Status: Chronic   


Qualifiers: 


   Hypertension type: essential hypertension   Qualified Code(s): I10 - 

Essential (primary) hypertension   





(4) Lymphedema


Current Visit: Yes   Status: Chronic   





(5) Cellulitis


Current Visit: Yes   Status: Chronic   


Qualifiers: 


   Site of cellulitis: extremity   Site of cellulitis of extremity: lower 

extremity   Laterality: right   Qualified Code(s): L03.115 - Cellulitis of 

right lower limb   





(6) GERD (gastroesophageal reflux disease)


Current Visit: Yes   Status: Chronic   


Qualifiers: 


   Esophagitis presence: esophagitis presence not specified   Qualified Code(s)

: K21.9 - Gastro-esophageal reflux disease without esophagitis   





(7) Alcohol use


Current Visit: Yes   Status: Chronic   





(8) Chronic pain


Current Visit: Yes   Status: Chronic   


Qualifiers: 


   Chronic pain type: chronic pain syndrome   Qualified Code(s): G89.4 - 

Chronic pain syndrome   





(9) Elevated liver function tests


Current Visit: Yes   Status: Acute   


Brief History of Present Illness: 





53-year-old  male presented emergency room with a syncopal episode.





Patient is visiting in the area.  He is originally from [place].  He is 

visiting his sister.  Patient reports that he fell about a week ago.  He 

apparently had a syncopal episode at that time.  He hit his head.  He was 

evaluated at a hospital in [place].  Patient found to have a right lower 

extremity cellulitis is well.  Patient was treated and evaluated.  He was to 

follow up with cardiology this week.  Today the patient was sitting at home 

with his sister.  He apparently had a syncopal episode.  During this time the 

patient does not recall the event.  Sister was present and helped some to the 

floor.  Sister reported that he started to shake for about 1.5 min.  He was 

more of a jerking sensation to the upper extremity.  This resolved.  EMS was 

called.  He was found to have blood pressure of 70/40.  He was given IV fluids.

  This improved with a blood pressure of 90/50.  Patient with history of 

lymphedema, GERD, alcohol use and hypertension.  Patient has been using Lasix 

40 mg daily, BenicarHCT 40 mg/25 mg daily.





In the ER patient was evaluated.  Initial blood pressure in the ER was 113/74.  

He is without any distress. He denied any chest pain, shortness of breath.  

Initial workup included venous Dopplers of the lower extremity which showed no 

DVT.  Chest x-ray unremarkable.  CT of the head unremarkable.  CBC 

unremarkable.  Lactic acid slightly elevated at 2.7.  Sodium 136, potassium 3.2

, creatinine 0.90 with a blood sugar 109. Due to nature of his symptoms the 

patient was admitted for observation.





When I evaluated the patient in the ER, he admits to drinking about 2-3 beers a 

day.  He is compliant with his blood pressure medication.  He has been feeling 

weak over the past several weeks.  He is also taking pain medication Tylenol #3.


Hospital Course: 





The patient did well in the course of his stay.  Patient presented with 

syncopal episode likely from medication and dehydration.  Patient has 

hypertension and lymphedema.  He was recently hospitalized for cellulitis in 

[place].  He previously had a syncopal episode recently and was to see 

cardiology next week in [place]. Patient had hypotension prior to coming in 

to the hospital.  This is likely related to dehydration and medication-Lasix, 

Benicar HCT.  He reports that his blood pressures have been low. Patient was 

admitted for observation.  Patient previously taking Lasix 40 mg daily and 

Benicar HCT.  Both medications were held during his stay.  Blood pressure 

stable off medication.  Edema to the lower extremity improved with hydration.  

CT of the head showed no acute changes or abnormality.  Venous Doppler of the 

lower extremity showed no DVT.   At discharge he is without any syncopal 

episode.  No headaches, dizziness, chest pain or shortness of breath.  At 

discharge Benicar HCT has been discontinued.  Patient may continue with Lasix 

20 mg 1 pill daily as needed for significant edema to the lower extremity.  He 

may continue with a 2000 cc per day fluid restriction and low-salt diet for his 

lymphedema.  Recommendations for the patient to follow up with PCP in 1 week.  

He is to see Cardiology in [place] this week recommendation is to check 

echocardiogram.  He is to continue to monitor his blood pressures daily.  If 

his blood pressure remains elevated above 140/90.  The patient may require 

medication.  I will recommend carvedilol.





The patient has mild cellulitis to the right lower extremity.  This is 

significantly improved.  Patient recently hospitalized for cellulitis.  At 

discharge he will continue with clindamycin 300 mg 1 pill 3 times a day for 5 

more days.  He will continue to clean the area with antibacterial soap and 

water.  He may place Bactroban ointment to the area.





Patient has lymphedema.  Patient will continue with a 2000 cc per day fluid 

restriction and low-salt diet.  He is to elevate his legs when sitting or 

lying.  Patient may continue with Lasix 20 mg once daily if with significant 

edema to the lower extremity.  This can be further addressed by cardiology as 

an outpatient.





Patient has chronic pain. Patient may continue with Tylenol #3 as needed for 

pain. He is to limit his pain medication.





Patient has GERD.  Patient may continue with Protonix 40 mg 1 pill once daily.  

Recommendations for the patient follow up with GI as an outpatient to further 

assess.





Patient reports alcohol use.  Alcohol cessation addressed in detail especially 

in light of lymphedema and hypertension.





Patient had mild elevation to liver function tests likely related to alcohol 

use.  Hepatitis panel obtained.  This can be followed up by his PCP.


Vital Signs/Physical Exam: 














Temp Pulse Resp BP Pulse Ox


 


 98.1 F   88   18   125/79   99 


 


 07/29/18 08:00  07/29/18 08:00  07/29/18 08:00  07/29/18 08:00  07/29/18 08:00








General: Alert, In no apparent distress, Oriented x3, Cooperative


HEENT: Atraumatic


Neck: Supple


Respiratory: Clear to auscultation bilaterally, Normal air movement


Cardiovascular: Normal pulses, Regular rate/rhythm


Gastrointestinal: Normal bowel sounds, Soft and benign, Non-distended, No 

tenderness, No masses, No rebound, No guarding


Musculoskeletal: No erythema, No tenderness, No warmth


Integumentary: No erythema, No warmth, No cyanosis, Other (Mild edema to the 

lower extremity that significantly improved.  Less erythema noted to the right 

ankle region or he had cellulitis.  This has almost resolved.)


Neurological: Normal speech, Normal strength at 5/5 x4 extr, Normal tone, 

Normal affect


Laboratory Data at Discharge: 














WBC  4.0 K/uL (4.3-10.9)  L D 07/29/18  05:36    


 


Hgb  12.8 g/dL (13.6-17.9)  L  07/29/18  05:36    


 


Hct  36.5 % (39.6-49.0)  L D 07/29/18  05:36    


 


Plt Count  128 K/uL (152-406)  L D 07/29/18  05:36    


 


PT  13.7 SECONDS (9.5-12.5)  H  07/28/18  14:15    


 


INR  1.16   07/28/18  14:15    


 


APTT  34.1 SECONDS (24.3-36.9)   07/28/18  14:15    


 


Sodium  139 mmol/L (136-145)   07/29/18  05:36    


 


Potassium  4.0 mmol/L (3.5-5.1)   07/29/18  05:36    


 


BUN  6 mg/dL (7-18)  L  07/29/18  05:36    


 


Creatinine  0.70 mg/dL (0.55-1.3)   07/29/18  05:36    


 


Glucose  90 mg/dL ()   07/29/18  05:36    


 


Magnesium  1.7 mg/dL (1.8-2.4)  L  07/29/18  05:36    


 


Total Bilirubin  0.7 mg/dL (0.2-1.0)   07/29/18  05:36    


 


AST  74 U/L (15-37)  H  07/29/18  05:36    


 


ALT  38 U/L (12-78)   07/29/18  05:36    


 


Alkaline Phosphatase  77 U/L ()   07/29/18  05:36    


 


Troponin I  < 0.02 ng/mL (0.0-0.045)   07/29/18  05:36    


 


Triglycerides  64 mg/dL (<150)   07/29/18  05:36    


 


Cholesterol  122 mg/dL (<200)   07/29/18  05:36    


 


HDL Cholesterol  43 mg/dL (40-60)   07/29/18  05:36    


 


Cholesterol/HDL Ratio  2.84   07/29/18  05:36    








Home Medications: 








Codeine/APAP [Tylenol #3*] 1 tab PO Q6HP PRN 07/28/18 


Aspirin [Aspirin EC 81 MG] 81 mg PO DAILY #90 tablet. 07/29/18 


Furosemide [Lasix] 20 mg PO DAILY PRN #30 tab 07/29/18 


Mupirocin Oint [Bactroban 2% Ointment*] 1 appl TOP BID #1 tube 07/29/18 


Pantoprazole [Protonix Tab*] 40 mg PO DAILYAC #30 tab 07/29/18 





New Medications: 


Aspirin [Aspirin EC 81 MG] 81 mg PO DAILY #90 tablet.


Furosemide [Lasix] 20 mg PO DAILY PRN #30 tab


 PRN Reason: Shortness Of Breath


Mupirocin Oint [Bactroban 2% Ointment*] 1 appl TOP BID #1 tube


Pantoprazole [Protonix Tab*] 40 mg PO DAILYAC #30 tab


Patient Discharge Instructions: 1.  Patient will need to follow up his PCP in 1 

week to follow up this hospitalization.  2.  Patient presented with syncopal 

episode likely from medication-Lasix/Benicar HCT and dehydration.  Patient has 

hypertension and lymphedema.  Patient improved with IV fluid hydration and 

discontinuation of medication. Blood pressure stable off medication.  Edema to 

the lower extremity improved with hydration.  CT of the head showed no acute 

changes or abnormality.  Venous Doppler of the lower extremity showed no DVT.   

At discharge Benicar HCT has been discontinued.  Patient may continue with 

Lasix 20 mg 1 pill daily as needed for significant edema to the lower 

extremity.  He may continue with a 2000 cc per day fluid restriction and low-

salt diet for his lymphedema.  Recommendations for the patient to follow up 

with PCP in 1 week.  He is to see Cardiology in [place] this week 

recommendation is to check echocardiogram and follow up this hospitalization.  

He is to continue to monitor his blood pressures daily.  If his blood pressure 

remains elevated above 140/90, medication may be required.  He can follow up 

with his PCP to further address.  3.  The patient has mild cellulitis to the 

right lower extremity.  This is significantly improved.  Patient recently 

hospitalized for cellulitis.  At discharge he will continue with clindamycin 

300 mg 1 pill 3 times a day for 5 more days.  He will continue to clean the 

area with antibacterial soap and water.  He may place Bactroban ointment to the 

area.  4.  Patient has lymphedema.  Patient will continue with a 2000 cc per 

day fluid restriction and low-salt diet.  He is to elevate his legs when 

sitting or lying.  Patient may continue with Lasix 20 mg once daily if with 

significant edema to the lower extremity.  This can be further addressed by 

cardiology as an outpatient.  5.  Patient has chronic pain. Patient may 

continue with Tylenol #3 as needed for pain. He is to limit his pain 

medication.  6.  Patient has GERD.  Patient may continue with Protonix 40 mg 1 

pill once daily.  Recommendations for the patient follow up with GI as an 

outpatient to further assess.  7.  Alcohol cessation education will be 

provided.  8.  Patient had elevated liver function tests likely related to 

alcohol use.  Alcohol cessation recommended.  Lab obtained.  His PCP will need 

to follow up on hepatitis panel.


Diet: AHA


Activity: Fall precautions


Time spent managing pt's care (in minutes): 55